# Patient Record
Sex: FEMALE | Race: WHITE | Employment: OTHER | URBAN - METROPOLITAN AREA
[De-identification: names, ages, dates, MRNs, and addresses within clinical notes are randomized per-mention and may not be internally consistent; named-entity substitution may affect disease eponyms.]

---

## 2018-08-06 ENCOUNTER — HOSPITAL ENCOUNTER (OUTPATIENT)
Facility: CLINIC | Age: 83
Discharge: HOME | End: 2018-08-08
Payer: MEDICARE

## 2018-08-06 ENCOUNTER — HOSPITAL ENCOUNTER (OUTPATIENT)
Dept: GENERAL RADIOLOGY | Facility: CLINIC | Age: 83
Discharge: HOME | End: 2018-08-08
Payer: MEDICARE

## 2018-08-06 DIAGNOSIS — R07.81 RIB PAIN ON LEFT SIDE: ICD-10-CM

## 2018-08-06 PROCEDURE — 71100 X-RAY EXAM RIBS UNI 2 VIEWS: CPT

## 2019-06-17 ENCOUNTER — HOSPITAL ENCOUNTER (OUTPATIENT)
Dept: GENERAL RADIOLOGY | Facility: CLINIC | Age: 84
Discharge: HOME | End: 2019-06-19
Payer: MEDICARE

## 2019-06-17 ENCOUNTER — HOSPITAL ENCOUNTER (OUTPATIENT)
Facility: CLINIC | Age: 84
Discharge: HOME | End: 2019-06-19
Payer: MEDICARE

## 2019-06-17 ENCOUNTER — HOSPITAL ENCOUNTER (OUTPATIENT)
Facility: CLINIC | Age: 84
Discharge: HOME | End: 2019-06-17
Payer: MEDICARE

## 2019-06-17 DIAGNOSIS — M17.10 KNEE ARTHROPATHY: ICD-10-CM

## 2019-06-17 DIAGNOSIS — M47.812 CERVICAL ARTHRITIS: ICD-10-CM

## 2019-06-17 DIAGNOSIS — M47.816 LUMBAR ARTHROPATHY: ICD-10-CM

## 2019-06-17 LAB
ANION GAP SERPL CALCULATED.3IONS-SCNC: 17 MMOL/L (ref 9–17)
BUN BLDV-MCNC: 23 MG/DL (ref 8–23)
BUN/CREAT BLD: ABNORMAL (ref 9–20)
CALCIUM SERPL-MCNC: 8.6 MG/DL (ref 8.6–10.4)
CHLORIDE BLD-SCNC: 92 MMOL/L (ref 98–107)
CO2: 24 MMOL/L (ref 20–31)
CREAT SERPL-MCNC: 0.93 MG/DL (ref 0.5–0.9)
GFR AFRICAN AMERICAN: >60 ML/MIN
GFR NON-AFRICAN AMERICAN: 57 ML/MIN
GFR SERPL CREATININE-BSD FRML MDRD: ABNORMAL ML/MIN/{1.73_M2}
GFR SERPL CREATININE-BSD FRML MDRD: ABNORMAL ML/MIN/{1.73_M2}
GLUCOSE BLD-MCNC: 150 MG/DL (ref 70–99)
POTASSIUM SERPL-SCNC: 3.2 MMOL/L (ref 3.7–5.3)
SODIUM BLD-SCNC: 133 MMOL/L (ref 135–144)

## 2019-06-17 PROCEDURE — 73560 X-RAY EXAM OF KNEE 1 OR 2: CPT

## 2019-06-17 PROCEDURE — 80048 BASIC METABOLIC PNL TOTAL CA: CPT

## 2019-06-17 PROCEDURE — 36415 COLL VENOUS BLD VENIPUNCTURE: CPT

## 2019-06-17 PROCEDURE — 72100 X-RAY EXAM L-S SPINE 2/3 VWS: CPT

## 2019-06-17 PROCEDURE — 72040 X-RAY EXAM NECK SPINE 2-3 VW: CPT

## 2020-05-06 ENCOUNTER — HOSPITAL ENCOUNTER (OUTPATIENT)
Age: 85
Discharge: HOME | End: 2020-05-06
Payer: MEDICARE

## 2020-05-06 ENCOUNTER — HOSPITAL ENCOUNTER (OUTPATIENT)
Dept: VASCULAR LAB | Age: 85
Discharge: HOME | End: 2020-05-06
Payer: MEDICARE

## 2020-05-06 LAB
ABSOLUTE EOS #: <0.03 K/UL (ref 0–0.44)
ABSOLUTE IMMATURE GRANULOCYTE: 0.03 K/UL (ref 0–0.3)
ABSOLUTE LYMPH #: 1.21 K/UL (ref 1.1–3.7)
ABSOLUTE MONO #: 0.89 K/UL (ref 0.1–1.2)
ALBUMIN SERPL-MCNC: 3.8 G/DL (ref 3.5–5.2)
ALBUMIN/GLOBULIN RATIO: 1.2 (ref 1–2.5)
ALP BLD-CCNC: 155 U/L (ref 35–104)
ALT SERPL-CCNC: 14 U/L (ref 5–33)
ANION GAP SERPL CALCULATED.3IONS-SCNC: 16 MMOL/L (ref 9–17)
AST SERPL-CCNC: 24 U/L
BASOPHILS # BLD: 1 % (ref 0–2)
BASOPHILS ABSOLUTE: 0.05 K/UL (ref 0–0.2)
BILIRUB SERPL-MCNC: 0.49 MG/DL (ref 0.3–1.2)
BUN BLDV-MCNC: 22 MG/DL (ref 8–23)
BUN/CREAT BLD: ABNORMAL (ref 9–20)
CALCIUM SERPL-MCNC: 9.5 MG/DL (ref 8.6–10.4)
CHLORIDE BLD-SCNC: 97 MMOL/L (ref 98–107)
CO2: 26 MMOL/L (ref 20–31)
CREAT SERPL-MCNC: 1.11 MG/DL (ref 0.5–0.9)
D-DIMER QUANTITATIVE: 5.78 MG/L FEU (ref 0–0.59)
DIFFERENTIAL TYPE: ABNORMAL
EOSINOPHILS RELATIVE PERCENT: 0 % (ref 1–4)
GFR AFRICAN AMERICAN: 56 ML/MIN
GFR NON-AFRICAN AMERICAN: 46 ML/MIN
GFR SERPL CREATININE-BSD FRML MDRD: ABNORMAL ML/MIN/{1.73_M2}
GFR SERPL CREATININE-BSD FRML MDRD: ABNORMAL ML/MIN/{1.73_M2}
GLUCOSE BLD-MCNC: 121 MG/DL (ref 70–99)
HCT VFR BLD CALC: 33 % (ref 36.3–47.1)
HEMOGLOBIN: 10.3 G/DL (ref 11.9–15.1)
IMMATURE GRANULOCYTES: 0 %
LYMPHOCYTES # BLD: 13 % (ref 24–43)
MCH RBC QN AUTO: 30.6 PG (ref 25.2–33.5)
MCHC RBC AUTO-ENTMCNC: 31.2 G/DL (ref 28.4–34.8)
MCV RBC AUTO: 97.9 FL (ref 82.6–102.9)
MONOCYTES # BLD: 10 % (ref 3–12)
NRBC AUTOMATED: 0 PER 100 WBC
PDW BLD-RTO: 14.6 % (ref 11.8–14.4)
PLATELET # BLD: 298 K/UL (ref 138–453)
PLATELET ESTIMATE: ABNORMAL
PMV BLD AUTO: 9.8 FL (ref 8.1–13.5)
POTASSIUM SERPL-SCNC: 3.9 MMOL/L (ref 3.7–5.3)
RBC # BLD: 3.37 M/UL (ref 3.95–5.11)
RBC # BLD: ABNORMAL 10*6/UL
SEG NEUTROPHILS: 76 % (ref 36–65)
SEGMENTED NEUTROPHILS ABSOLUTE COUNT: 7.01 K/UL (ref 1.5–8.1)
SODIUM BLD-SCNC: 139 MMOL/L (ref 135–144)
TOTAL PROTEIN: 7 G/DL (ref 6.4–8.3)
WBC # BLD: 9.2 K/UL (ref 3.5–11.3)
WBC # BLD: ABNORMAL 10*3/UL

## 2020-05-06 PROCEDURE — 82607 VITAMIN B-12: CPT

## 2020-05-06 PROCEDURE — 36415 COLL VENOUS BLD VENIPUNCTURE: CPT

## 2020-05-06 PROCEDURE — 80053 COMPREHEN METABOLIC PANEL: CPT

## 2020-05-06 PROCEDURE — 93971 EXTREMITY STUDY: CPT

## 2020-05-06 PROCEDURE — 85025 COMPLETE CBC W/AUTO DIFF WBC: CPT

## 2020-05-06 PROCEDURE — 85379 FIBRIN DEGRADATION QUANT: CPT

## 2020-05-06 PROCEDURE — 82728 ASSAY OF FERRITIN: CPT

## 2020-05-07 LAB
FERRITIN: 220 UG/L (ref 13–150)
VITAMIN B-12: 626 PG/ML (ref 232–1245)

## 2020-12-02 ENCOUNTER — HOSPITAL ENCOUNTER (OUTPATIENT)
Dept: GENERAL RADIOLOGY | Facility: CLINIC | Age: 85
Discharge: HOME | End: 2020-12-04
Payer: MEDICARE

## 2020-12-02 ENCOUNTER — HOSPITAL ENCOUNTER (OUTPATIENT)
Facility: CLINIC | Age: 85
Discharge: HOME | End: 2020-12-04
Payer: MEDICARE

## 2020-12-02 ENCOUNTER — HOSPITAL ENCOUNTER (OUTPATIENT)
Facility: CLINIC | Age: 85
Discharge: HOME | End: 2020-12-02
Payer: MEDICARE

## 2020-12-02 LAB
ANION GAP SERPL CALCULATED.3IONS-SCNC: 12 MMOL/L (ref 9–17)
BUN BLDV-MCNC: 27 MG/DL (ref 8–23)
BUN/CREAT BLD: ABNORMAL (ref 9–20)
CALCIUM SERPL-MCNC: 9.6 MG/DL (ref 8.6–10.4)
CHLORIDE BLD-SCNC: 100 MMOL/L (ref 98–107)
CO2: 28 MMOL/L (ref 20–31)
CREAT SERPL-MCNC: 1.11 MG/DL (ref 0.5–0.9)
GFR AFRICAN AMERICAN: 56 ML/MIN
GFR NON-AFRICAN AMERICAN: 46 ML/MIN
GFR SERPL CREATININE-BSD FRML MDRD: ABNORMAL ML/MIN/{1.73_M2}
GFR SERPL CREATININE-BSD FRML MDRD: ABNORMAL ML/MIN/{1.73_M2}
GLUCOSE BLD-MCNC: 119 MG/DL (ref 70–99)
POTASSIUM SERPL-SCNC: 4.1 MMOL/L (ref 3.7–5.3)
SODIUM BLD-SCNC: 140 MMOL/L (ref 135–144)

## 2020-12-02 PROCEDURE — 73502 X-RAY EXAM HIP UNI 2-3 VIEWS: CPT

## 2020-12-02 PROCEDURE — 80048 BASIC METABOLIC PNL TOTAL CA: CPT

## 2020-12-02 PROCEDURE — 36415 COLL VENOUS BLD VENIPUNCTURE: CPT

## 2020-12-14 ENCOUNTER — HOSPITAL ENCOUNTER (OUTPATIENT)
Dept: ULTRASOUND IMAGING | Facility: CLINIC | Age: 85
Discharge: HOME | End: 2020-12-16
Payer: MEDICARE

## 2020-12-14 PROCEDURE — 76882 US LMTD JT/FCL EVL NVASC XTR: CPT

## 2021-07-19 PROBLEM — L98.9 INFLAMMATORY DERMATOSIS: Status: ACTIVE | Noted: 2021-07-19

## 2023-06-19 PROBLEM — I73.9 PERIPHERAL VASCULAR DISEASE (HCC): Status: ACTIVE | Noted: 2022-07-14

## 2023-06-19 PROBLEM — S72.009A CLOSED FRACTURE OF NECK OF FEMUR (HCC): Status: ACTIVE | Noted: 2023-06-19

## 2023-06-19 PROBLEM — M47.812 CERVICAL ARTHRITIS: Status: ACTIVE | Noted: 2023-06-19

## 2023-06-19 PROBLEM — E87.1 HYPONATREMIA: Status: ACTIVE | Noted: 2023-06-19

## 2023-06-19 PROBLEM — M47.817 LUMBOSACRAL SPONDYLOSIS WITHOUT MYELOPATHY: Status: ACTIVE | Noted: 2023-06-19

## 2023-06-19 PROBLEM — B02.9 HERPES ZOSTER WITHOUT COMPLICATION: Status: ACTIVE | Noted: 2023-06-19

## 2023-06-19 PROBLEM — R60.9 EDEMA: Status: ACTIVE | Noted: 2023-06-19

## 2023-06-19 PROBLEM — E87.6 HYPOKALEMIA: Status: ACTIVE | Noted: 2023-06-19

## 2023-06-19 PROBLEM — M47.26 OSTEOARTHRITIS OF SPINE WITH RADICULOPATHY, LUMBAR REGION: Status: ACTIVE | Noted: 2022-08-26

## 2023-06-19 PROBLEM — I10 BENIGN HYPERTENSION: Status: ACTIVE | Noted: 2022-07-14

## 2023-06-19 PROBLEM — G62.9 NEUROPATHY: Status: ACTIVE | Noted: 2023-06-19

## 2023-06-19 PROBLEM — R60.0 LOWER EXTREMITY EDEMA: Status: ACTIVE | Noted: 2022-08-26

## 2023-06-19 PROBLEM — N28.9 DISORDER OF KIDNEY AND URETER, UNSPECIFIED: Status: ACTIVE | Noted: 2023-06-19

## 2023-06-21 ENCOUNTER — HOSPITAL ENCOUNTER (OUTPATIENT)
Dept: ULTRASOUND IMAGING | Age: 88
Discharge: HOME | End: 2023-06-23
Attending: SURGERY
Payer: MEDICARE

## 2023-06-21 DIAGNOSIS — C50.912 MALIGNANT NEOPLASM OF LEFT FEMALE BREAST, UNSPECIFIED ESTROGEN RECEPTOR STATUS, UNSPECIFIED SITE OF BREAST (HCC): ICD-10-CM

## 2023-06-21 PROCEDURE — 76642 ULTRASOUND BREAST LIMITED: CPT

## 2023-06-23 ENCOUNTER — HOSPITAL ENCOUNTER (OUTPATIENT)
Dept: ULTRASOUND IMAGING | Age: 88
End: 2023-06-23
Payer: MEDICARE

## 2023-06-23 DIAGNOSIS — N63.10 MASS OF RIGHT BREAST, UNSPECIFIED QUADRANT: ICD-10-CM

## 2023-06-23 PROCEDURE — 88342 IMHCHEM/IMCYTCHM 1ST ANTB: CPT

## 2023-06-23 PROCEDURE — 88341 IMHCHEM/IMCYTCHM EA ADD ANTB: CPT

## 2023-06-23 PROCEDURE — 38505 NEEDLE BIOPSY LYMPH NODES: CPT

## 2023-06-23 PROCEDURE — 88305 TISSUE EXAM BY PATHOLOGIST: CPT

## 2023-06-23 PROCEDURE — 2500000003 HC RX 250 WO HCPCS

## 2023-06-26 LAB — SURGICAL PATHOLOGY REPORT: NORMAL

## 2023-07-05 ENCOUNTER — HOSPITAL ENCOUNTER (OUTPATIENT)
Dept: PREADMISSION TESTING | Age: 88
Discharge: HOME | End: 2023-07-05
Payer: MEDICARE

## 2023-07-05 VITALS
BODY MASS INDEX: 17.36 KG/M2 | SYSTOLIC BLOOD PRESSURE: 150 MMHG | HEIGHT: 66 IN | WEIGHT: 108 LBS | HEART RATE: 98 BPM | OXYGEN SATURATION: 94 % | TEMPERATURE: 98.3 F | DIASTOLIC BLOOD PRESSURE: 80 MMHG

## 2023-07-05 DIAGNOSIS — Z01.818 PRE-OP TESTING: Primary | ICD-10-CM

## 2023-07-05 LAB
ANION GAP SERPL CALCULATED.3IONS-SCNC: 11 MMOL/L (ref 9–17)
BUN SERPL-MCNC: 18 MG/DL (ref 8–23)
CALCIUM SERPL-MCNC: 10.3 MG/DL (ref 8.6–10.4)
CHLORIDE SERPL-SCNC: 103 MMOL/L (ref 98–107)
CO2 SERPL-SCNC: 28 MMOL/L (ref 20–31)
CREAT SERPL-MCNC: 0.91 MG/DL (ref 0.5–0.9)
ERYTHROCYTE [DISTWIDTH] IN BLOOD BY AUTOMATED COUNT: 14.2 % (ref 11.8–14.4)
GFR SERPL CREATININE-BSD FRML MDRD: 60 ML/MIN/1.73M2
GLUCOSE SERPL-MCNC: 94 MG/DL (ref 70–99)
HCT VFR BLD AUTO: 39 % (ref 36.3–47.1)
HGB BLD-MCNC: 12.6 G/DL (ref 11.9–15.1)
MCH RBC QN AUTO: 30.8 PG (ref 25.2–33.5)
MCHC RBC AUTO-ENTMCNC: 32.3 G/DL (ref 28.4–34.8)
MCV RBC AUTO: 95.4 FL (ref 82.6–102.9)
NRBC BLD-RTO: 0 PER 100 WBC
PLATELET # BLD AUTO: 235 K/UL (ref 138–453)
PMV BLD AUTO: 10.3 FL (ref 8.1–13.5)
POTASSIUM SERPL-SCNC: 4.2 MMOL/L (ref 3.7–5.3)
RBC # BLD AUTO: 4.09 M/UL (ref 3.95–5.11)
SODIUM SERPL-SCNC: 142 MMOL/L (ref 135–144)
WBC OTHER # BLD: 7.4 K/UL (ref 3.5–11.3)

## 2023-07-05 PROCEDURE — 80048 BASIC METABOLIC PNL TOTAL CA: CPT

## 2023-07-05 PROCEDURE — 36415 COLL VENOUS BLD VENIPUNCTURE: CPT

## 2023-07-05 PROCEDURE — 85027 COMPLETE CBC AUTOMATED: CPT

## 2023-07-05 RX ORDER — LISINOPRIL AND HYDROCHLOROTHIAZIDE 25; 20 MG/1; MG/1
1 TABLET ORAL DAILY
COMMUNITY

## 2023-07-05 NOTE — DISCHARGE INSTRUCTIONS
DAY OF SURGERY/PROCEDURE  GUIDELINES    As a patient at the [facility], you can expect quality medical and nursing care that is centered on your individual needs. It is our goal to make your surgical experience as comfortable and excellent as possible.  ________________________________________________________________________    The following instructions are general guidelines, if any information on this sheet is different from what your doctor has instructed you to do, please follow your doctor's instructions. Please arrive on 7/14 @ 800am      Enter through entrance C. Check in at registration     Upon arrival you will be taken to the pre-operative area to get ready for surgery, your family will stay in the waiting room and visit with you once you are ready for surgery. Due to special limitations please limit visitation to 1-2 members of your family at a time. When it is time for surgery your family will return to the waiting room. Nothing to eat, drink, smoke, suck or chew after midnight (no water, gum, mints, cigarettes, cigars, pipes, snuff, chewing tobacco, etc.) or your surgery may be canceled. Take a shower or bath on the morning of your surgery/procedure (Hibiclens if directed) Do not apply any lotions. Brush your teeth, but do not swallow any water    IN CASE OF ILLNESS - If you have a cold or flu symptoms (high fever, runny nose, sore throat, cough, etc.) rash, nausea, vomiting, loose stools, and/or recent contact with someone who has a contagious disease (chick pox, measles, etc.) please call your doctor before coming to the surgery center    If applicable bring your:  Inhaler (s)  Hearing aid(s)  Eyeglasses and Case (If you wear contacts they have to be removed before surgery, bring case and solution)  CPAP     DO NOT take anticoagulants (blood thinners, aspirin or aspirin-containing products) as instructed by your physician.     Leave all jewelry at home and wear

## 2023-07-07 ENCOUNTER — ANESTHESIA EVENT (OUTPATIENT)
Dept: OPERATING ROOM | Age: 88
End: 2023-07-07
Payer: MEDICARE

## 2023-07-08 LAB
EKG ATRIAL RATE: 86 BPM
EKG P AXIS: 83 DEGREES
EKG P-R INTERVAL: 176 MS
EKG Q-T INTERVAL: 404 MS
EKG QRS DURATION: 134 MS
EKG QTC CALCULATION (BAZETT): 483 MS
EKG R AXIS: -73 DEGREES
EKG T AXIS: 69 DEGREES
EKG VENTRICULAR RATE: 86 BPM

## 2023-07-14 ENCOUNTER — ANESTHESIA (OUTPATIENT)
Dept: OPERATING ROOM | Age: 88
End: 2023-07-14
Payer: MEDICARE

## 2023-07-14 ENCOUNTER — HOSPITAL ENCOUNTER (OUTPATIENT)
Age: 88
Setting detail: OBSERVATION
Discharge: HOME | End: 2023-07-15
Attending: SURGERY | Admitting: SURGERY
Payer: MEDICARE

## 2023-07-14 DIAGNOSIS — C50.912 MALIGNANT NEOPLASM OF LEFT FEMALE BREAST, UNSPECIFIED ESTROGEN RECEPTOR STATUS, UNSPECIFIED SITE OF BREAST (HCC): Primary | ICD-10-CM

## 2023-07-14 PROBLEM — G89.18 POST-MASTECTOMY PAIN: Status: ACTIVE | Noted: 2023-07-14

## 2023-07-14 PROCEDURE — G0378 HOSPITAL OBSERVATION PER HR: HCPCS

## 2023-07-14 PROCEDURE — 2580000003 HC RX 258: Performed by: SURGERY

## 2023-07-14 PROCEDURE — 7100000001 HC PACU RECOVERY - ADDTL 15 MIN: Performed by: SURGERY

## 2023-07-14 PROCEDURE — 3600000015 HC SURGERY LEVEL 5 ADDTL 15MIN: Performed by: SURGERY

## 2023-07-14 PROCEDURE — 3600000005 HC SURGERY LEVEL 5 BASE: Performed by: SURGERY

## 2023-07-14 PROCEDURE — 3700000001 HC ADD 15 MINUTES (ANESTHESIA): Performed by: SURGERY

## 2023-07-14 PROCEDURE — 6360000002 HC RX W HCPCS

## 2023-07-14 PROCEDURE — 2500000003 HC RX 250 WO HCPCS

## 2023-07-14 PROCEDURE — 94640 AIRWAY INHALATION TREATMENT: CPT

## 2023-07-14 PROCEDURE — 2500000003 HC RX 250 WO HCPCS: Performed by: SURGERY

## 2023-07-14 PROCEDURE — 6360000002 HC RX W HCPCS: Performed by: ANESTHESIOLOGY

## 2023-07-14 PROCEDURE — 2709999900 HC NON-CHARGEABLE SUPPLY: Performed by: SURGERY

## 2023-07-14 PROCEDURE — 6360000002 HC RX W HCPCS: Performed by: SURGERY

## 2023-07-14 PROCEDURE — 7100000000 HC PACU RECOVERY - FIRST 15 MIN: Performed by: SURGERY

## 2023-07-14 PROCEDURE — C9399 UNCLASSIFIED DRUGS OR BIOLOG: HCPCS

## 2023-07-14 PROCEDURE — 3700000000 HC ANESTHESIA ATTENDED CARE: Performed by: SURGERY

## 2023-07-14 PROCEDURE — 6370000000 HC RX 637 (ALT 250 FOR IP): Performed by: ANESTHESIOLOGY

## 2023-07-14 PROCEDURE — 2700000000 HC OXYGEN THERAPY PER DAY

## 2023-07-14 PROCEDURE — 2580000003 HC RX 258: Performed by: ANESTHESIOLOGY

## 2023-07-14 PROCEDURE — 6370000000 HC RX 637 (ALT 250 FOR IP)

## 2023-07-14 PROCEDURE — 88307 TISSUE EXAM BY PATHOLOGIST: CPT

## 2023-07-14 RX ORDER — IPRATROPIUM BROMIDE AND ALBUTEROL SULFATE 2.5; .5 MG/3ML; MG/3ML
1 SOLUTION RESPIRATORY (INHALATION) ONCE
Status: COMPLETED | OUTPATIENT
Start: 2023-07-14 | End: 2023-07-14

## 2023-07-14 RX ORDER — ACETAMINOPHEN 325 MG/1
650 TABLET ORAL EVERY 4 HOURS PRN
Status: DISCONTINUED | OUTPATIENT
Start: 2023-07-14 | End: 2023-07-15 | Stop reason: HOSPADM

## 2023-07-14 RX ORDER — METOCLOPRAMIDE HYDROCHLORIDE 5 MG/ML
10 INJECTION INTRAMUSCULAR; INTRAVENOUS
Status: DISCONTINUED | OUTPATIENT
Start: 2023-07-14 | End: 2023-07-15 | Stop reason: HOSPADM

## 2023-07-14 RX ORDER — MORPHINE SULFATE 2 MG/ML
2 INJECTION, SOLUTION INTRAMUSCULAR; INTRAVENOUS
Status: DISCONTINUED | OUTPATIENT
Start: 2023-07-14 | End: 2023-07-15 | Stop reason: HOSPADM

## 2023-07-14 RX ORDER — OXYCODONE HYDROCHLORIDE 5 MG/1
5 TABLET ORAL EVERY 4 HOURS PRN
Status: DISCONTINUED | OUTPATIENT
Start: 2023-07-14 | End: 2023-07-15 | Stop reason: HOSPADM

## 2023-07-14 RX ORDER — DEXAMETHASONE SODIUM PHOSPHATE 10 MG/ML
INJECTION, SOLUTION INTRAMUSCULAR; INTRAVENOUS PRN
Status: DISCONTINUED | OUTPATIENT
Start: 2023-07-14 | End: 2023-07-14 | Stop reason: SDUPTHER

## 2023-07-14 RX ORDER — LIDOCAINE HYDROCHLORIDE 10 MG/ML
1 INJECTION, SOLUTION INFILTRATION; PERINEURAL
Status: ACTIVE | OUTPATIENT
Start: 2023-07-14 | End: 2023-07-15

## 2023-07-14 RX ORDER — ONDANSETRON 4 MG/1
4 TABLET, ORALLY DISINTEGRATING ORAL EVERY 8 HOURS PRN
Status: DISCONTINUED | OUTPATIENT
Start: 2023-07-14 | End: 2023-07-15 | Stop reason: HOSPADM

## 2023-07-14 RX ORDER — HYDROCODONE BITARTRATE AND ACETAMINOPHEN 5; 325 MG/1; MG/1
1 TABLET ORAL EVERY 4 HOURS PRN
Qty: 18 TABLET | Refills: 0 | Status: SHIPPED | OUTPATIENT
Start: 2023-07-14 | End: 2023-07-17

## 2023-07-14 RX ORDER — ONDANSETRON 2 MG/ML
INJECTION INTRAMUSCULAR; INTRAVENOUS
Status: DISCONTINUED
Start: 2023-07-14 | End: 2023-07-14 | Stop reason: WASHOUT

## 2023-07-14 RX ORDER — DIPHENHYDRAMINE HYDROCHLORIDE 50 MG/ML
INJECTION INTRAMUSCULAR; INTRAVENOUS PRN
Status: DISCONTINUED | OUTPATIENT
Start: 2023-07-14 | End: 2023-07-14 | Stop reason: SDUPTHER

## 2023-07-14 RX ORDER — BUPIVACAINE HYDROCHLORIDE AND EPINEPHRINE 2.5; 5 MG/ML; UG/ML
INJECTION, SOLUTION EPIDURAL; INFILTRATION; INTRACAUDAL; PERINEURAL
Status: DISPENSED
Start: 2023-07-14 | End: 2023-07-14

## 2023-07-14 RX ORDER — FENTANYL CITRATE 50 UG/ML
INJECTION, SOLUTION INTRAMUSCULAR; INTRAVENOUS PRN
Status: DISCONTINUED | OUTPATIENT
Start: 2023-07-14 | End: 2023-07-14 | Stop reason: SDUPTHER

## 2023-07-14 RX ORDER — LIDOCAINE HYDROCHLORIDE 10 MG/ML
INJECTION, SOLUTION INFILTRATION; PERINEURAL PRN
Status: DISCONTINUED | OUTPATIENT
Start: 2023-07-14 | End: 2023-07-14 | Stop reason: SDUPTHER

## 2023-07-14 RX ORDER — SODIUM CHLORIDE 0.9 % (FLUSH) 0.9 %
5-40 SYRINGE (ML) INJECTION EVERY 12 HOURS SCHEDULED
Status: DISCONTINUED | OUTPATIENT
Start: 2023-07-14 | End: 2023-07-15 | Stop reason: HOSPADM

## 2023-07-14 RX ORDER — DEXTROSE, SODIUM CHLORIDE, AND POTASSIUM CHLORIDE 5; .45; .15 G/100ML; G/100ML; G/100ML
INJECTION INTRAVENOUS CONTINUOUS
Status: DISCONTINUED | OUTPATIENT
Start: 2023-07-14 | End: 2023-07-15 | Stop reason: HOSPADM

## 2023-07-14 RX ORDER — HYDRALAZINE HYDROCHLORIDE 20 MG/ML
10 INJECTION INTRAMUSCULAR; INTRAVENOUS
Status: DISCONTINUED | OUTPATIENT
Start: 2023-07-14 | End: 2023-07-15 | Stop reason: HOSPADM

## 2023-07-14 RX ORDER — SODIUM CHLORIDE 0.9 % (FLUSH) 0.9 %
5-40 SYRINGE (ML) INJECTION PRN
Status: DISCONTINUED | OUTPATIENT
Start: 2023-07-14 | End: 2023-07-15 | Stop reason: HOSPADM

## 2023-07-14 RX ORDER — ONDANSETRON 2 MG/ML
INJECTION INTRAMUSCULAR; INTRAVENOUS PRN
Status: DISCONTINUED | OUTPATIENT
Start: 2023-07-14 | End: 2023-07-14 | Stop reason: SDUPTHER

## 2023-07-14 RX ORDER — MORPHINE SULFATE 2 MG/ML
1 INJECTION, SOLUTION INTRAMUSCULAR; INTRAVENOUS
Status: DISCONTINUED | OUTPATIENT
Start: 2023-07-14 | End: 2023-07-15 | Stop reason: HOSPADM

## 2023-07-14 RX ORDER — CILOSTAZOL 100 MG/1
100 TABLET ORAL 2 TIMES DAILY
COMMUNITY

## 2023-07-14 RX ORDER — OXYCODONE HYDROCHLORIDE 5 MG/1
10 TABLET ORAL EVERY 4 HOURS PRN
Status: DISCONTINUED | OUTPATIENT
Start: 2023-07-14 | End: 2023-07-15 | Stop reason: HOSPADM

## 2023-07-14 RX ORDER — DIPHENHYDRAMINE HYDROCHLORIDE 50 MG/ML
12.5 INJECTION INTRAMUSCULAR; INTRAVENOUS
Status: DISCONTINUED | OUTPATIENT
Start: 2023-07-14 | End: 2023-07-15 | Stop reason: HOSPADM

## 2023-07-14 RX ORDER — CILOSTAZOL 100 MG/1
100 TABLET ORAL 2 TIMES DAILY
Status: DISCONTINUED | OUTPATIENT
Start: 2023-07-14 | End: 2023-07-15 | Stop reason: HOSPADM

## 2023-07-14 RX ORDER — CEFAZOLIN 2 G/1
INJECTION, POWDER, FOR SOLUTION INTRAMUSCULAR; INTRAVENOUS
Status: DISPENSED
Start: 2023-07-14 | End: 2023-07-14

## 2023-07-14 RX ORDER — IPRATROPIUM BROMIDE AND ALBUTEROL SULFATE 2.5; .5 MG/3ML; MG/3ML
SOLUTION RESPIRATORY (INHALATION)
Status: COMPLETED
Start: 2023-07-14 | End: 2023-07-14

## 2023-07-14 RX ORDER — LISINOPRIL 20 MG/1
20 TABLET ORAL DAILY
Status: DISCONTINUED | OUTPATIENT
Start: 2023-07-15 | End: 2023-07-14

## 2023-07-14 RX ORDER — ROCURONIUM BROMIDE 10 MG/ML
INJECTION, SOLUTION INTRAVENOUS PRN
Status: DISCONTINUED | OUTPATIENT
Start: 2023-07-14 | End: 2023-07-14 | Stop reason: SDUPTHER

## 2023-07-14 RX ORDER — BUPIVACAINE HYDROCHLORIDE AND EPINEPHRINE 2.5; 5 MG/ML; UG/ML
INJECTION, SOLUTION INFILTRATION; PERINEURAL PRN
Status: DISCONTINUED | OUTPATIENT
Start: 2023-07-14 | End: 2023-07-14 | Stop reason: ALTCHOICE

## 2023-07-14 RX ORDER — SENNA PLUS 8.6 MG/1
1 TABLET ORAL DAILY PRN
Status: DISCONTINUED | OUTPATIENT
Start: 2023-07-14 | End: 2023-07-15 | Stop reason: HOSPADM

## 2023-07-14 RX ORDER — ONDANSETRON 2 MG/ML
4 INJECTION INTRAMUSCULAR; INTRAVENOUS EVERY 6 HOURS PRN
Status: DISCONTINUED | OUTPATIENT
Start: 2023-07-14 | End: 2023-07-15 | Stop reason: HOSPADM

## 2023-07-14 RX ORDER — LISINOPRIL AND HYDROCHLOROTHIAZIDE 25; 20 MG/1; MG/1
1 TABLET ORAL DAILY
Status: DISCONTINUED | OUTPATIENT
Start: 2023-07-15 | End: 2023-07-15 | Stop reason: HOSPADM

## 2023-07-14 RX ORDER — LISINOPRIL AND HYDROCHLOROTHIAZIDE 25; 20 MG/1; MG/1
1 TABLET ORAL DAILY
Status: DISCONTINUED | OUTPATIENT
Start: 2023-07-15 | End: 2023-07-14 | Stop reason: SDUPTHER

## 2023-07-14 RX ORDER — PROPOFOL 10 MG/ML
INJECTION, EMULSION INTRAVENOUS PRN
Status: DISCONTINUED | OUTPATIENT
Start: 2023-07-14 | End: 2023-07-14 | Stop reason: SDUPTHER

## 2023-07-14 RX ORDER — SODIUM CHLORIDE 9 MG/ML
INJECTION, SOLUTION INTRAVENOUS PRN
Status: DISCONTINUED | OUTPATIENT
Start: 2023-07-14 | End: 2023-07-15 | Stop reason: HOSPADM

## 2023-07-14 RX ORDER — DROPERIDOL 2.5 MG/ML
0.62 INJECTION, SOLUTION INTRAMUSCULAR; INTRAVENOUS
Status: DISCONTINUED | OUTPATIENT
Start: 2023-07-14 | End: 2023-07-15 | Stop reason: HOSPADM

## 2023-07-14 RX ORDER — SODIUM CHLORIDE, SODIUM LACTATE, POTASSIUM CHLORIDE, CALCIUM CHLORIDE 600; 310; 30; 20 MG/100ML; MG/100ML; MG/100ML; MG/100ML
INJECTION, SOLUTION INTRAVENOUS CONTINUOUS
Status: DISCONTINUED | OUTPATIENT
Start: 2023-07-14 | End: 2023-07-14

## 2023-07-14 RX ORDER — HYDROCHLOROTHIAZIDE 25 MG/1
25 TABLET ORAL DAILY
Status: DISCONTINUED | OUTPATIENT
Start: 2023-07-15 | End: 2023-07-14

## 2023-07-14 RX ADMIN — PROPOFOL 25 MG: 10 INJECTION, EMULSION INTRAVENOUS at 11:02

## 2023-07-14 RX ADMIN — PROPOFOL 20 MG: 10 INJECTION, EMULSION INTRAVENOUS at 10:34

## 2023-07-14 RX ADMIN — FENTANYL CITRATE 25 MCG: 50 INJECTION, SOLUTION INTRAMUSCULAR; INTRAVENOUS at 10:24

## 2023-07-14 RX ADMIN — HYDROMORPHONE HYDROCHLORIDE 0.25 MG: 1 INJECTION, SOLUTION INTRAMUSCULAR; INTRAVENOUS; SUBCUTANEOUS at 11:52

## 2023-07-14 RX ADMIN — IPRATROPIUM BROMIDE AND ALBUTEROL SULFATE 3 ML: .5; 2.5 SOLUTION RESPIRATORY (INHALATION) at 09:29

## 2023-07-14 RX ADMIN — FENTANYL CITRATE 25 MCG: 50 INJECTION, SOLUTION INTRAMUSCULAR; INTRAVENOUS at 10:34

## 2023-07-14 RX ADMIN — FENTANYL CITRATE 50 MCG: 50 INJECTION, SOLUTION INTRAMUSCULAR; INTRAVENOUS at 09:50

## 2023-07-14 RX ADMIN — HYDROMORPHONE HYDROCHLORIDE 0.25 MG: 1 INJECTION, SOLUTION INTRAMUSCULAR; INTRAVENOUS; SUBCUTANEOUS at 12:09

## 2023-07-14 RX ADMIN — CEFAZOLIN 1000 MG: 2 INJECTION, POWDER, FOR SOLUTION INTRAMUSCULAR; INTRAVENOUS at 10:01

## 2023-07-14 RX ADMIN — ONDANSETRON 4 MG: 2 INJECTION INTRAMUSCULAR; INTRAVENOUS at 10:47

## 2023-07-14 RX ADMIN — SODIUM CHLORIDE, POTASSIUM CHLORIDE, SODIUM LACTATE AND CALCIUM CHLORIDE: 600; 310; 30; 20 INJECTION, SOLUTION INTRAVENOUS at 09:42

## 2023-07-14 RX ADMIN — PROPOFOL 100 MG: 10 INJECTION, EMULSION INTRAVENOUS at 09:50

## 2023-07-14 RX ADMIN — SUGAMMADEX 50 MG: 100 INJECTION, SOLUTION INTRAVENOUS at 11:04

## 2023-07-14 RX ADMIN — DIPHENHYDRAMINE HYDROCHLORIDE 10 MG: 50 INJECTION, SOLUTION INTRAMUSCULAR; INTRAVENOUS at 10:08

## 2023-07-14 RX ADMIN — SODIUM CHLORIDE, POTASSIUM CHLORIDE, SODIUM LACTATE AND CALCIUM CHLORIDE: 600; 310; 30; 20 INJECTION, SOLUTION INTRAVENOUS at 11:16

## 2023-07-14 RX ADMIN — LIDOCAINE HYDROCHLORIDE 40 MG: 10 INJECTION, SOLUTION INFILTRATION; PERINEURAL at 09:50

## 2023-07-14 RX ADMIN — DEXAMETHASONE SODIUM PHOSPHATE 4 MG: 10 INJECTION, SOLUTION INTRAMUSCULAR; INTRAVENOUS at 10:08

## 2023-07-14 RX ADMIN — POTASSIUM CHLORIDE, DEXTROSE MONOHYDRATE AND SODIUM CHLORIDE: 150; 5; 450 INJECTION, SOLUTION INTRAVENOUS at 15:32

## 2023-07-14 RX ADMIN — ROCURONIUM BROMIDE 35 MG: 10 INJECTION, SOLUTION INTRAVENOUS at 09:55

## 2023-07-14 RX ADMIN — SUGAMMADEX 25 MG: 100 INJECTION, SOLUTION INTRAVENOUS at 11:00

## 2023-07-14 RX ADMIN — PHENYLEPHRINE HYDROCHLORIDE 50 MCG: 10 INJECTION INTRAVENOUS at 10:49

## 2023-07-14 RX ADMIN — CILOSTAZOL 100 MG: 100 TABLET ORAL at 19:35

## 2023-07-14 RX ADMIN — SUGAMMADEX 25 MG: 100 INJECTION, SOLUTION INTRAVENOUS at 11:03

## 2023-07-14 RX ADMIN — SODIUM CHLORIDE, PRESERVATIVE FREE 10 ML: 5 INJECTION INTRAVENOUS at 19:41

## 2023-07-14 RX ADMIN — IPRATROPIUM BROMIDE AND ALBUTEROL SULFATE 1 DOSE: .5; 2.5 SOLUTION RESPIRATORY (INHALATION) at 09:29

## 2023-07-14 RX ADMIN — PHENYLEPHRINE HYDROCHLORIDE 50 MCG: 10 INJECTION INTRAVENOUS at 10:15

## 2023-07-14 ASSESSMENT — PAIN SCALES - GENERAL
PAINLEVEL_OUTOF10: 6
PAINLEVEL_OUTOF10: 3
PAINLEVEL_OUTOF10: 2
PAINLEVEL_OUTOF10: 3
PAINLEVEL_OUTOF10: 3
PAINLEVEL_OUTOF10: 6
PAINLEVEL_OUTOF10: 3
PAINLEVEL_OUTOF10: 5

## 2023-07-14 ASSESSMENT — PAIN DESCRIPTION - LOCATION
LOCATION: BREAST

## 2023-07-14 ASSESSMENT — PAIN - FUNCTIONAL ASSESSMENT
PAIN_FUNCTIONAL_ASSESSMENT: 0-10
PAIN_FUNCTIONAL_ASSESSMENT: ACTIVITIES ARE NOT PREVENTED

## 2023-07-14 ASSESSMENT — PAIN DESCRIPTION - ORIENTATION
ORIENTATION: LEFT

## 2023-07-14 ASSESSMENT — PAIN DESCRIPTION - DESCRIPTORS
DESCRIPTORS: SORE

## 2023-07-14 ASSESSMENT — PAIN DESCRIPTION - PAIN TYPE: TYPE: SURGICAL PAIN

## 2023-07-14 ASSESSMENT — PAIN DESCRIPTION - ONSET: ONSET: ON-GOING

## 2023-07-14 ASSESSMENT — PAIN DESCRIPTION - FREQUENCY: FREQUENCY: CONTINUOUS

## 2023-07-14 NOTE — PLAN OF CARE
Problem: ABCDS Injury Assessment  Goal: Absence of physical injury  Outcome: Progressing  Flowsheets (Taken 7/14/2023 1200)  Absence of Physical Injury: Implement safety measures based on patient assessment     Problem: Pain  Goal: Verbalizes/displays adequate comfort level or baseline comfort level  Outcome: Progressing  Flowsheets  Taken 7/14/2023 1527  Verbalizes/displays adequate comfort level or baseline comfort level:   Encourage patient to monitor pain and request assistance   Assess pain using appropriate pain scale   Administer analgesics based on type and severity of pain and evaluate response   Implement non-pharmacological measures as appropriate and evaluate response  Taken 7/14/2023 1215  Verbalizes/displays adequate comfort level or baseline comfort level:   Encourage patient to monitor pain and request assistance   Assess pain using appropriate pain scale   Administer analgesics based on type and severity of pain and evaluate response   Implement non-pharmacological measures as appropriate and evaluate response     Problem: Safety - Adult  Goal: Free from fall injury  Outcome: Progressing  Flowsheets (Taken 7/14/2023 1200)  Free From Fall Injury:   Instruct family/caregiver on patient safety   Based on caregiver fall risk screen, instruct family/caregiver to ask for assistance with transferring infant if caregiver noted to have fall risk factors     Problem: Discharge Planning  Goal: Discharge to home or other facility with appropriate resources  Outcome: Progressing  Flowsheets (Taken 7/14/2023 1235)  Discharge to home or other facility with appropriate resources:   Identify barriers to discharge with patient and caregiver   Arrange for needed discharge resources and transportation as appropriate   Identify discharge learning needs (meds, wound care, etc)   Refer to discharge planning if patient needs post-hospital services based on physician order or complex needs related to functional status,

## 2023-07-14 NOTE — H&P
H&P  Jluis Ratliff DO  General Surgery Malignant neoplasm of left breast, stage 2 [facility])  Dx Breast Cancer; Referred by Elise Benz MD  Reason for Visit     Progress Notes  Jluis Ratliff DO (Physician)   General Surgery  Expand All Collapse All           Lissette Hollis Nuha Cruz is a 80 y.o. female who presents for surgery. HPI: Trevin Hall comes to [facility] for an evaluation of the left breast. She has not had a mammogram for years and noticed a left breast mass in the past few months. No pain or nipple discharge. Fern shows a 3.5 cm mass in patient's very small breast. 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 The breast is smaller on the left due to a benign breast lumpectomy done years ago. Trevin Hall is clear she does not want radiation and is fine with proceeding with mastectomy. All relevant imaging and lab studies have been reviewed prior to today's visit. Any recent medical records have also been reviewed. Breast Risk Factors:  Age of First Menstrual Period: 6  Age of First Child? 23  How many children? 1  Were children breast fed? No  Date of last Mammogram: May 2023  Date of Last Period: 1983  Postmenopausal? Yes  Hysterectomy? No  Birth control or hormone therapy? Yes, Hormone Therapy  Any previous breast biopsies? Yes  Personal Hx of CA? No  Family Hx of Breast CA? No        There is no height or weight on file to calculate BMI. There were no vitals filed for this visit. Allergies   Allergen Reactions    Shellfish-Derived Products        Family History   No family history on file. Social History               Socioeconomic History    Marital status:         Spouse name: Not on file    Number of children: Not on file    Years of education: Not on file    Highest education level: Not on file   Occupational History    Not on file   Tobacco Use    Smoking status: Light Smoker    Smokeless tobacco: Never   Substance and Sexual Activity    Alcohol use: Not on file

## 2023-07-14 NOTE — BRIEF OP NOTE
Brief Postoperative Note      Patient: Melly Castano  YOB: 1933  MRN: [id number]    Date of Procedure: 7/14/2023    Pre-Op Diagnosis Codes:     * Malignant neoplasm of left female breast, unspecified estrogen receptor status, unspecified site of breast ([address]) [C50.912]    Post-Op Diagnosis: Same       Procedure(s):  LEFT BREAST MASTECTOMY WITH AXILLARY SENTINEL LYMPH NODE DISSECTION    Surgeon(s):  Catrina Malhotra DO    Assistant:  * No surgical staff found *    Anesthesia: General    Estimated Blood Loss (mL): less than 50     Complications: None    Specimens:   * No specimens in log *    Implants:  * No implants in log *      Drains: * No LDAs found *    Findings: as above  Axillary Lymph Node Dissection for Breast Cancer - Left  Operation performed with curative intent. Yes   Resection was performed within the boundaries of the axillary vein, chest wall (serratus anterior), and latissimus dorsi. Yes   Nerves identified and preserved during dissection (select all that apply). Long thoracic nerve, ICB,Thoradorsal bundle   Level III nodes were removed.  No         Electronically signed by Catrina Malhotra DO on 7/14/2023 at 9:37 AM

## 2023-07-14 NOTE — DISCHARGE INSTRUCTIONS
Normal diet  No showers until see   No lifting with left arm for 2 weeks, no driving for 2 weeks  May use ice to left underarm as desired  May alternate ibuprofen and tylenol extra strength as directed on bottle  Empty and strip drain daily and record output  May start Pletal on Sunday  Keep appt with  on Monday  Take Norco as prescribed  Call [phone] if any problems

## 2023-07-14 NOTE — OP NOTE
Operative Note      Patient: Teetee Hanson  YOB: 1933  MRN: [id number]    Date of Procedure: 7/14/2023    Pre-Op Diagnosis Codes:     * Malignant neoplasm of left female breast, unspecified estrogen receptor status, unspecified site of breast ([address]) [C50.912]    Post-Op Diagnosis: Same       Procedure(s):  LEFT BREAST MASTECTOMY WITH AXILLARY LYMPH NODE DISSECTION    Surgeon(s):  Savannah Reilly DO    Assistant:   First Assistant: Kenn Blanca RN  Resident: Sandi Martin DO    Anesthesia: General    Estimated Blood Loss (mL): 15ml    Abx: Ancef 1g    IVF: 1000ml crystalloid    Complications: None    Specimens:   ID Type Source Tests Collected by Time Destination   A : Left Breast; Double short black suture marking superior, single long black suture marking lateral, nipple and skin anterior Tissue Breast [address], DO 7/14/2023 1022    B : Left Axillary Lymph Nodes Tissue Tissue SURGICAL [address], DO 7/14/2023 1040        Implants:  * No implants in log *      Drains:   Closed/Suction Drain Inferior; Left Breast Bulb (Active)     Indication: Patient is a 89y female with left breast adenocarcinoma and biopsy proven left axillary lymph node metastasis who is here for left mastectomy with left axillary lymph node dissection. Informed consent previously obtained with operating surgeon and signed, present in chart. Findings: WC-I, successful performance of left breast mastectomy with left axillary lymph node dissection and removal of all palpable or visible nodes    Axillary Lymph Node Dissection for Breast Cancer - Left  Operation performed with curative intent. Yes   Resection was performed within the boundaries of the axillary vein, chest wall (serratus anterior), and latissimus dorsi. Yes   Nerves identified and preserved during dissection (select all that apply).  Long thoracic nerve and Thoracodorsal nerve not encountered during the dissection

## 2023-07-14 NOTE — ANESTHESIA PRE PROCEDURE
plan and risks discussed with patient. Plan discussed with CRNA.                     Sierra Landeros MD   7/14/2023

## 2023-07-14 NOTE — CARE COORDINATION
N/A            Potential DME:    Patient expects to discharge to: Independent living facility  Plan for transportation at discharge:      Financial    Payor: Juvencio Oneal / Plan: Pearley Patches HMO / Product Type: Medicare /     Does insurance require precert for SNF: Yes    Potential assistance Purchasing Medications: No  Meds-to-Beds request:        [facility] [id number] Orvan Lefort [address] [place]ie [place] [address] [facility] [address]  Phone: [phone] Fax: [phone]      Notes:    Factors facilitating achievement of predicted outcomes: Caregiver support, Motivated, Cooperative, Pleasant, and Good insight into deficits    Barriers to discharge: Medical complications    Additional Case Management Notes: Plan to home [facility] independent living. DME [employer], [employer]  VNS:  denies need  Family support . 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 Freedom of choice list with basic dialogue that supports the patient's individualized plan of care/goals and shares the quality data associated with the providers was provided to:     Patient Representative Name:       The Patient and/or Patient Representative Agree with the Discharge Plan?       Unknown FRANCO Briscoe  Case Management Department  Ph:  Fax:

## 2023-07-14 NOTE — ANESTHESIA POSTPROCEDURE EVALUATION
Department of Anesthesiology  Postprocedure Note    Patient: Thao Loera  MRN: [id number]  YOB: 1933  Date of evaluation: 7/14/2023      Procedure Summary     Date: 07/14/23 Room / Location: [facility] OR 02 / [address]    Anesthesia Start: 8716 Anesthesia Stop: 9830    Procedure: LEFT BREAST MASTECTOMY WITH AXILLARY LYMPH NODE DISSECTION (Left: Breast) Diagnosis:       Malignant neoplasm of left female breast, unspecified estrogen receptor status, unspecified site of breast ([address])      (Malignant neoplasm of left female breast, unspecified estrogen receptor status, unspecified site of breast ([address]) [C50.912])    Surgeons: Miguel Ross DO Responsible Provider: Stormy Graf MD    Anesthesia Type: general ASA Status: 2          Anesthesia Type: No value filed.     Darinel Phase I: Darinel Score: 8    Darinel Phase II:        Anesthesia Post Evaluation    Patient location during evaluation: PACU  Patient participation: complete - patient participated  Level of consciousness: awake and alert  Airway patency: patent  Nausea & Vomiting: no nausea and no vomiting  Complications: no  Cardiovascular status: blood pressure returned to baseline  Respiratory status: acceptable and room air  Hydration status: euvolemic

## 2023-07-15 VITALS
TEMPERATURE: 97.7 F | HEART RATE: 96 BPM | WEIGHT: 108 LBS | DIASTOLIC BLOOD PRESSURE: 64 MMHG | SYSTOLIC BLOOD PRESSURE: 142 MMHG | HEIGHT: 66 IN | RESPIRATION RATE: 16 BRPM | OXYGEN SATURATION: 89 % | BODY MASS INDEX: 17.36 KG/M2

## 2023-07-15 PROCEDURE — G0378 HOSPITAL OBSERVATION PER HR: HCPCS

## 2023-07-15 RX ADMIN — LISINOPRIL AND HYDROCHLOROTHIAZIDE 1 TABLET: 25; 20 TABLET ORAL at 08:18

## 2023-07-15 RX ADMIN — CILOSTAZOL 100 MG: 100 TABLET ORAL at 08:18

## 2023-07-15 ASSESSMENT — PAIN SCALES - GENERAL
PAINLEVEL_OUTOF10: 3
PAINLEVEL_OUTOF10: 2

## 2023-07-15 NOTE — PLAN OF CARE
Problem: ABCDS Injury Assessment  Goal: Absence of physical injury  7/15/2023 0736 by Durga Waite RN  Outcome: Completed  7/15/2023 0344 by Adarsh Harris RN  Outcome: Progressing  7/14/2023 1938 by Wendy Posey RN  Outcome: Progressing  Flowsheets (Taken 7/14/2023 1200)  Absence of Physical Injury: Implement safety measures based on patient assessment     Problem: Pain  Goal: Verbalizes/displays adequate comfort level or baseline comfort level  7/15/2023 0736 by Durga Waite RN  Outcome: Completed  7/15/2023 0344 by Adarsh Harris RN  Outcome: Progressing  7/14/2023 1938 by Wendy Posey RN  Outcome: Progressing  Flowsheets  Taken 7/14/2023 1527  Verbalizes/displays adequate comfort level or baseline comfort level:   Encourage patient to monitor pain and request assistance   Assess pain using appropriate pain scale   Administer analgesics based on type and severity of pain and evaluate response   Implement non-pharmacological measures as appropriate and evaluate response  Taken 7/14/2023 1215  Verbalizes/displays adequate comfort level or baseline comfort level:   Encourage patient to monitor pain and request assistance   Assess pain using appropriate pain scale   Administer analgesics based on type and severity of pain and evaluate response   Implement non-pharmacological measures as appropriate and evaluate response     Problem: Safety - Adult  Goal: Free from fall injury  7/15/2023 0736 by Durga Waite RN  Outcome: Completed  7/15/2023 0344 by Adarsh Harris RN  Outcome: Progressing  7/14/2023 1938 by Wendy Posey RN  Outcome: Progressing  Flowsheets (Taken 7/14/2023 1200)  Free From Fall Injury:   Instruct family/caregiver on patient safety   Based on caregiver fall risk screen, instruct family/caregiver to ask for assistance with transferring infant if caregiver noted to have fall risk factors     Problem: Discharge Planning  Goal: Discharge to home or other facility with appropriate

## 2023-07-15 NOTE — PLAN OF CARE
Problem: Discharge Planning  Goal: Discharge to home or other facility with appropriate resources  7/15/2023 0344 by Paul Bethea RN  Outcome: Progressing   Problem: Pain  Goal: Verbalizes/displays adequate comfort level or baseline comfort level  7/15/2023 0344 by Paul Bethea RN  Outcome: Progressing   No new signs/symptoms of pain noted, pain rating < 3 on scale of 0-10, pain controlled with medication/ repositioning   Problem: Skin/Tissue Integrity  Goal: Absence of new skin breakdown  Description: 1. Monitor for areas of redness and/or skin breakdown  2. Assess vascular access sites hourly  3. Every 4-6 hours minimum:  Change oxygen saturation probe site  4. Every 4-6 hours:  If on nasal continuous positive airway pressure, respiratory therapy assess nares and determine need for appliance change or resting period.   Outcome: Progressing   No new skin breakdown noted, no new signs/symptoms of infection, continue to monitor lab work including WBC, medications administered per physician orders   Problem: Safety - Adult  Goal: Free from fall injury  7/15/2023 0344 by Paul Bethea RN  Outcome: Progressing   No falls/ injuries this shift, bed in lowest position, brakes on, bed alarm on, call light in reach, side rails up x2

## 2023-07-19 LAB — SURGICAL PATHOLOGY REPORT: NORMAL

## (undated) DEVICE — 2DSM24 2-0 UND MONODERM 30X30: Brand: 2DSM24 2-0 UND MONODERM 30X30

## (undated) DEVICE — PAD,ABDOMINAL,8"X7.5",ST,LF,20/BX: Brand: MEDLINE INDUSTRIES, INC.

## (undated) DEVICE — ELECTRODE PT RET AD L9FT HI MOIST COND ADH HYDRGEL CORDED

## (undated) DEVICE — SUTURE VCRL + SZ 3-0 L27IN ABSRB UD L26MM SH 1/2 CIR VCP416H

## (undated) DEVICE — BLANKET WRM W40.2XL55.9IN IORT LO BODY + MISTRAL AIR

## (undated) DEVICE — SUTURE NONABSORBABLE MONOFILAMENT 3-0 PS-1 18 IN BLK ETHILON 1663H

## (undated) DEVICE — SOLUTION IRRIG 1000ML 0.9% SOD CHL USP POUR PLAS BTL

## (undated) DEVICE — MHPB GEN MINOR PACK: Brand: MEDLINE INDUSTRIES, INC.

## (undated) DEVICE — SUTURE PERMA-HAND SZ 2-0 L30IN NONABSORBABLE BLK L26MM SH K833H

## (undated) DEVICE — SUTURE PERMAHAND SZ 0 L30IN NONABSORBABLE BLK L26MM SH 1/2 K834H

## (undated) DEVICE — APPLICATOR MEDICATED 26 CC SOLUTION HI LT ORNG CHLORAPREP

## (undated) DEVICE — DRAPE,REIN 53X77,STERILE: Brand: MEDLINE

## (undated) DEVICE — CONTAINER,SPECIMEN,4OZ,OR STRL: Brand: MEDLINE

## (undated) DEVICE — GLOVE SURG SZ 65 THK91MIL LTX FREE SYN POLYISOPRENE

## (undated) DEVICE — SPONGE LAP W18XL18IN WHT ENHANCEDXRAY VISIBILITY DATA MSTR

## (undated) DEVICE — BRA COMPR LG NYL LYCRA SPANDEX WHT CURAD

## (undated) DEVICE — SKIN AFFIX SURG ADHESIVE 72/CS 0.55ML: Brand: MEDLINE

## (undated) DEVICE — BLADE ES ELASTOMERIC COAT INSUL DURABLE BEND UPTO 90DEG

## (undated) DEVICE — STRAP,POSITIONING,KNEE/BODY,FOAM,4X60": Brand: MEDLINE

## (undated) DEVICE — ELECTRODE ELECSURG NDL 2.8 INX7.2 CM COAT INSUL EDGE